# Patient Record
Sex: FEMALE | Race: BLACK OR AFRICAN AMERICAN | NOT HISPANIC OR LATINO | URBAN - METROPOLITAN AREA
[De-identification: names, ages, dates, MRNs, and addresses within clinical notes are randomized per-mention and may not be internally consistent; named-entity substitution may affect disease eponyms.]

---

## 2021-01-04 ENCOUNTER — EMERGENCY (EMERGENCY)
Facility: HOSPITAL | Age: 46
LOS: 1 days | Discharge: HOME | End: 2021-01-04
Attending: EMERGENCY MEDICINE | Admitting: EMERGENCY MEDICINE
Payer: MEDICARE

## 2021-01-04 VITALS
RESPIRATION RATE: 18 BRPM | OXYGEN SATURATION: 100 % | SYSTOLIC BLOOD PRESSURE: 153 MMHG | HEART RATE: 96 BPM | DIASTOLIC BLOOD PRESSURE: 80 MMHG | WEIGHT: 139.99 LBS | TEMPERATURE: 99 F

## 2021-01-04 DIAGNOSIS — R25.1 TREMOR, UNSPECIFIED: ICD-10-CM

## 2021-01-04 DIAGNOSIS — M62.830 MUSCLE SPASM OF BACK: ICD-10-CM

## 2021-01-04 PROCEDURE — 99283 EMERGENCY DEPT VISIT LOW MDM: CPT

## 2021-01-04 PROCEDURE — 99282 EMERGENCY DEPT VISIT SF MDM: CPT

## 2021-01-04 RX ORDER — DIPHENHYDRAMINE HCL 50 MG
50 CAPSULE ORAL ONCE
Refills: 0 | Status: DISCONTINUED | OUTPATIENT
Start: 2021-01-04 | End: 2021-01-04

## 2021-01-04 NOTE — ED PROVIDER NOTE - PATIENT PORTAL LINK FT
You can access the FollowMyHealth Patient Portal offered by [facility] by registering at the following website: [email or web address]. By joining [employer]’s FollowMyHealth portal, you will also be able to view your health information using other applications (apps) compatible with our system.

## 2021-01-04 NOTE — ED ADULT NURSE REASSESSMENT NOTE - NS ED NURSE REASSESS COMMENT FT1
Pt sitting calmly in chair. No tremulous/muscular contraction activity noted. Security at bedside. All safety precautions maintained.

## 2021-01-04 NOTE — ED PROVIDER NOTE - CLINICAL SUMMARY MEDICAL DECISION MAKING FREE TEXT BOX
45F PMH bipolar, raynauds p/w spasms. Pt states that ~3w ago she slipped and fell, ?hit her head. Since then she has episodic spasms/tics of her entire body. Was at [facility] 12/19 and 12/22, had CTH c/thoracic/lumbar spine all w/ no acute pathology. Was at other ERs since then. Has had blood work reportedly wnl. Valium w/o relief. Came to [facility] ED today bec symptoms slowly worsening. No other systemic symptoms. Vitals wnl, exam as above. Very well appearing.  ddx: Unclear etiology. Possible aspect of dystonic rexn. Possible psych related. Possible other neuro process.   Symptoms >3weeks.  Clinically no indication for further emergent ED workup or hospitalization at this time. Comfortable for dc, outpt f/u.   discussed importance of outpt neuro f/u.

## 2021-01-04 NOTE — ED PROVIDER NOTE - PROGRESS NOTE DETAILS
Ron: pt walked out of ED prior to receiving benadryl/reassessment or information for neuro f/u. I had discussed w/ her importance of outpt f/u.

## 2021-01-04 NOTE — ED ADULT NURSE REASSESSMENT NOTE - NS ED NURSE REASSESS COMMENT FT1
Pt yelling at staff members. Provider  process explained. Explained to patient has not been assigned to a provider yet. Pt started thrashing limbs around, throwing head back. Pt remained alert and oriented throughout, speaking in clear full sentences through entire episode. JANINA Sosa at bedside with primary RN. All safety precautions maintained.

## 2021-01-04 NOTE — ED ADULT NURSE REASSESSMENT NOTE - NS ED NURSE REASSESS COMMENT FT1
Pt offered PO benadryl as per order from MD Velasquez. Pt refused. Pt requesting wanting to leave. Did not want to wait for DC papers. Refused discharge vital signs. Pt ambulatory w steady gait out of emergency department.

## 2021-01-04 NOTE — ED PROVIDER NOTE - OBJECTIVE STATEMENT
45F PMH bipolar, raynauds p/w spasms. Pt states that ~3w ago she slipped and fell, ?hit her head. Since then she has episodic spasms/tics of her entire body. Was at [facility] 12/19 and 12/22, had CTH c/thoracic/lumbar spine all w/ no acute pathology. Was at other ERs since then. Has had blood work reportedly wnl. Valium w/o relief. Came to [facility] ED today bec symptoms slowly worsening.   States she has seen her psychiatrist who didn't think much of it. States other ERs just ignored her. HAs not followed up w/ neuro yet.   Denies HA, vision changes, focal weakness/numbness, vertigo, neck pain, rashes, tinnitus, hearing changes, URI symptoms, f/c, SOB/CP, NVD, abd pain, urinary complaints, black/bloody stool, lifestyle/dietary/medication changes.   meds: lamicatal, norvasc

## 2021-01-04 NOTE — ED ADULT TRIAGE NOTE - CHIEF COMPLAINT QUOTE
Patient c/o tremors and involuntary muscular movements to bilateral arms and legs that started 12/20/20 after slipping and falling on ice in the sidewalk.  Patient was seen at [facility] ED 12/20/20.

## 2021-01-04 NOTE — ED ADULT NURSE REASSESSMENT NOTE - NS ED NURSE REASSESS COMMENT FT1
Pt sitting comfortably in chair. Pt noted to have no tremulous movements/tics at the moment. All safety precautions maintained.

## 2021-01-04 NOTE — ED ADULT NURSE NOTE - CHIEF COMPLAINT QUOTE
Patient c/o tremors and involuntary muscular movements to bilateral arms and legs that started 12/20/20 after slipping and falling on ice in the sidewalk.  Patient was seen at [facility] ED 12/20/20.

## 2021-01-04 NOTE — ED ADULT NURSE REASSESSMENT NOTE - NS ED NURSE REASSESS COMMENT FT1
ANM Olga at bedside to deescalate patient and make sure all safety precautions being maitnained. Pt yelling "shut the fuck up get away from me". Security called. Security and ANM at bedside.

## 2021-01-04 NOTE — ED PROVIDER NOTE - CROS ED ROS STATEMENT
all other ROS negative except as per HPI
Normal vision: sees adequately in most situations; can see medication labels, newsprint

## 2021-01-04 NOTE — ED PROVIDER NOTE - PHYSICAL EXAMINATION
episodic spasms/movements of all 4 extremities at random, also trunk. very rare when pt being observed from far, dramatic increase in frequency during interview and exam  PERRL, EOMI, no nystagmus. CN intact. Strength 5/5. Steady unassisted gait. No pronator drift. Sensation intact. Normal speech, no dysarthria. No carotid bruits. Negative Romberg.  no LE edema, normal equal distal pulses, steady unassisted gait.

## 2021-01-04 NOTE — ED PROVIDER NOTE - NSFOLLOWUPINSTRUCTIONS_ED_ALL_ED_FT
It is unclear what exactly is causing your symptoms! It is important to continue following up with your doctor outside the hospital and to return to ER for: Persistent fever/vomiting, uncontrolled pain, worsening swelling, worsening breathing, worsening lightheaded, spreading redness.     Follow up with neurologist. Can call [phone] to schedule appointment.   Follow up with primary doctor within 1-2 days.   Follow up with your psychiatrist.

## 2021-01-04 NOTE — ED ADULT NURSE NOTE - OBJECTIVE STATEMENT
Pt presents to ED c/o tremor. Pt reports 3 weeks ago slipped on ice and hit back of head. Was seen at another ED immediately s/p fall, discharged with a concussion. Reports since the fall has been experiencing full body involuntary muscular jerks and twitches. Reports has been seen by 4 other ERs with no findings. A&ox4. Speaking in clear full sentences, VSS.

## 2021-01-12 PROBLEM — Z00.00 ENCOUNTER FOR PREVENTIVE HEALTH EXAMINATION: Status: ACTIVE | Noted: 2021-01-12

## 2021-01-26 ENCOUNTER — APPOINTMENT (OUTPATIENT)
Dept: NEUROLOGY | Facility: CLINIC | Age: 46
End: 2021-01-26